# Patient Record
(demographics unavailable — no encounter records)

---

## 2024-10-04 NOTE — ASSESSMENT
[FreeTextEntry1] : Osiris is a 13 y/o male with history of ADHD and ASD, referred by Behavioral Health for ADHD management.   He was referred to  by school psychologist for further evaluation of inattention and social struggles. He is enrolled in a regular high school and has an appropriate IEP with ICT classes, ST, OT.

## 2024-10-04 NOTE — HISTORY OF PRESENT ILLNESS
[FreeTextEntry1] : Osiris is a 15 y/o male with history of ADHD and ASD, referred by Behavioral Health for ADHD management.    Patient lives with parents, younger sister and brother, and is currently enrolled at [employer], 9th grade (IEP, ICT classes, ST, OT).  He was referred to [facility] by school psychologist for further evaluation of inattention and social struggles.  Reviewed visit and summary of 09/23/24  visit: Patient presented with limited insight vs. guardedness at  visit and did not identify difficulties to work on; he had denied difficulties with focus or social interactions, and denied any safety concerns as well. No SI or HI identified. Father relayed patient with long standing inattention, hx of ADHD, and social struggles / limited friendships.  Presentationf found to be consistent with ASD and ADHD sx, agreeable to connection to pediatric neurology as well as therapy/social skills group. Was provided psychoeducation including OPWDD and other parent supports.  Age of ASD diagnosis Age of ADHD diagnosis: Previous evaluations Previous providers: Medications: IEP:       DEVELOPMENTAL HX   Early Intervention Services were not needed.     EDUCATIONAL HISTORY   PreK: : Elementary School; Middle School:     HOME BEHAVIORS:   -Attention: Parent report patient is very easily distracted, needs frequent redirection, and has difficulty with multi-step instructions?   -Hyperactivity: Fidgety   -Impulsivity:  Is not aggressive, does not interrupts when others are speaking.   -Organization: Has difficulty staying organized, often loses things   -Homework:     CURRENT SCHOOL -School:  -Public school  -Special Ed services- 504 plan or IEP -  Classification: - General education classroom - Special Ed/educational services:     - PT     -OT     -ST -Accommodations- -Academic performance/grades:     RELATIONSHIPS: Gets along well with peers, parents.   EMOTIONAL   SLEEP   ACTIVITIES/INTERESTS:     MEDICAL HISTORY: Denies a history of tics Denies history of starting spells, twitching, seizure-like activity.   FAMILY HISTORY: Family history of ADHD: Family history of anxiety or depression: Denies family history of cardiac conditions or early unexplained deaths.